# Patient Record
Sex: FEMALE | Race: WHITE
[De-identification: names, ages, dates, MRNs, and addresses within clinical notes are randomized per-mention and may not be internally consistent; named-entity substitution may affect disease eponyms.]

---

## 2023-01-20 ENCOUNTER — HOSPITAL ENCOUNTER (EMERGENCY)
Dept: HOSPITAL 33 - ED | Age: 88
Discharge: ANOTHER HOSPITAL | End: 2023-01-20
Payer: MEDICARE

## 2023-01-20 VITALS — HEART RATE: 68 BPM | DIASTOLIC BLOOD PRESSURE: 61 MMHG | SYSTOLIC BLOOD PRESSURE: 115 MMHG | OXYGEN SATURATION: 94 %

## 2023-01-20 DIAGNOSIS — S02.842A: ICD-10-CM
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
## 2023-01-20 DIAGNOSIS — S02.40DA: ICD-10-CM

## 2023-01-20 DIAGNOSIS — S02.32XA: ICD-10-CM

## 2023-01-20 DIAGNOSIS — Z79.02: ICD-10-CM

## 2023-01-20 DIAGNOSIS — S01.112A: Primary | ICD-10-CM
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
## 2023-01-20 DIAGNOSIS — Z79.899: ICD-10-CM

## 2023-01-20 PROCEDURE — 70486 CT MAXILLOFACIAL W/O DYE: CPT

## 2023-01-20 PROCEDURE — 99284 EMERGENCY DEPT VISIT MOD MDM: CPT

## 2023-01-20 PROCEDURE — 72125 CT NECK SPINE W/O DYE: CPT

## 2023-01-20 PROCEDURE — 70450 CT HEAD/BRAIN W/O DYE: CPT

## 2023-01-20 PROCEDURE — 12011 RPR F/E/E/N/L/M 2.5 CM/<: CPT

## 2023-01-20 NOTE — XRAY
Indication: Left frontal and left periorbital pain following fall.



Multiple contiguous axial images obtained through the head without contrast.



Comparison: October 29, 2016



Again age-appropriate global atrophy and progressive worsening mild

periventricular degenerative micro-ischemia bilaterally.  No acute

intracranial hemorrhage, abnormal extra-axial fluid collection, or mass

effect.  New left facial and left supraorbital soft tissue swelling/hematoma.

Bony calvarium intact.

CT facial bones and CT cervical spine reported separately.



Impression: Atrophy and degenerative micro-ischemia within normal limits for

patient's age.  No new/acute intracranial abnormalities.



Comment: Preliminary interpretation made by [facility].  No critical discrepancy. Please call patient and let him know

## 2023-01-20 NOTE — ERPHSYRPT
- History of Present Illness


Time Seen by Provider: 01/20/23 00:22


Source: patient, EMS


Exam Limitations: clinical condition


Physician History: 





89 years old female resident of nursing home presented in the ER via EMS with 

chief complaint of fall while she went to the bathroom, lost her balance and hit

her left lateral eyebrow/orbital ridge against bathtub.  No loss of 

consciousness.  Has a laceration.  No difficulty movements of eyeball.  Patient 

denies injury anywhere else.  Denies any chest pain palpitations or shortness of

breath before or after the fall. 


Occurred: just prior to arrival


Severity: moderate


Head Injury Location: frontal


Method of Injury: fell


Loss of Consciousness: no loss of consciousness


Associated Symptoms: denies symptoms


Allergies/Adverse Reactions: 








No Known Drug Allergies Allergy (Verified 10/29/16 15:01)


   





Home Medications: 








Clopidogrel Bisulfate [Plavix] 75 mg PO DAILY 07/28/16 [History]


Donepezil HCl 10 mg PO HS 01/20/23 [History]


Escitalopram Oxalate [Lexapro] 2.5 mg PO DAILY 01/20/23 [History]


Famotidine 20 mg*** [Pepcid 20 MG***] 20 mg PO BID 01/20/23 [History]


Ferrous Sulfate [Ferosul] 325 mg PO BID 01/20/23 [History]


Latanoprost/Pf [Latanoprost 0.005% Eye Drop] 1 drop OP HS 01/20/23 [History]


Memantine HCl 10 mg PO BID 01/20/23 [History]


Timolol Maleate 0.25% Eye*** [Timolol 0.25% Opth Sol 5 ML***] 1 drop OP DAILY 

01/20/23 [History]


clonazePAM [Clonazepam] 0.125 mg PO DAILY 01/20/23 [History]


clonazePAM [Clonazepam] 0.25 mg PO HS 01/20/23 [History]





Hx Tetanus, Diphtheria Vaccination/Date Given: No


Hx Influenza Vaccination/Date Given: Yes


Hx Pneumococcal Vaccination/Date Given: Yes





- Review of Systems


Constitutional: No Symptoms


Eyes: No Symptoms


Ears, Nose, & Throat: No Symptoms


Respiratory: No Symptoms


Cardiac: No Symptoms


Abdominal/Gastrointestinal: No Symptoms


Genitourinary Symptoms: No Symptoms


Musculoskeletal: Fall


Skin: Skin Lesions


Neurological: Headache


Endocrine: No Symptoms


Hematologic/Lymphatic: No Symptoms


Immunological/Allergic: No Symptoms





- Past Medical History


Pertinent Past Medical History: Yes


Neurological History: No Pertinent History


ENT History: No Pertinent History


Cardiac History: Angina


Respiratory History: No Pertinent History


Endocrine Medical History: No Pertinent History


Musculoskeletal History: No Pertinent History


GI Medical History: GERD


 History: No Pertinent History, Other


Psycho-Social History: Anxiety, Depression


Female Reproductive Disorders: No Pertinent History


Other Medical History: some "memory troubles".  increased pressure in eyes.  3 

NATURAL BIRTHS





- Past Surgical History


Past Surgical History: Yes


Neuro Surgical History: No Pertinent History


Cardiac: No Pertinent History


Respiratory: No Pertinent History


Gastrointestinal: Cholecystectomy


Genitourinary: No Pertinent History, Other


Musculoskeletal: No Pertinent History


Female Surgical History: Hysterectomy


Other Surgical History: bilateral oophorectomy; surgery on both hands; eye 

surgery (pt can't remember what was done); bladder surgery "years ago, they 

stretched my bladder out".





- Social History


Smoking Status: Never smoker


Exposure to second hand smoke: No


Drug Use: none


Patient Lives Alone: No





- Nursing Vital Signs


Nursing Vital Signs: 


                               Initial Vital Signs











Temperature  97.0 F   01/20/23 00:18


 


Pulse Rate  69   01/20/23 00:18


 


Respiratory Rate  16   01/20/23 00:18


 


Blood Pressure  174/79   01/20/23 00:18


 


O2 Sat by Pulse Oximetry  96   01/20/23 00:18








                                   Pain Scale











Pain Intensity                 5

















- [place] Coma Score


Best Eye Response (Zoe): (4) open spontaneously


Best Verbal Response (Zoe): (5) oriented


Best Motor Response (Zoe): (6) obeys commands


Zoe Total: 15





- Physical Exam


General Appearance: no apparent distress, alert


Head Injury: contusions, lacerations (2.5 cm laceration left lateral orbital 

ridge.  Minimal oozing.  Hematoma.  No step in deformity.  Intact range of 

motion of left eyeball.), swelling, tenderness


Eye Exam: bilateral eye: normal inspection, PERRL, EOMI


ENT Exam: airway nml, No evidence of ENT injury, No dental injury


Neck Exam: supple, trachea midline, normal alignment, c-collar in place


Cardiovascular/Respiratory Exam: chest non-tender, normal breath sounds, regular

 rate/rhythm


Gastrointestinal/Abdominal Exam: soft, No no distention


Extremity Exam: non-tender, normal range of motion, normal inspection


Mental Status Exam: alert, oriented x 3, cooperative


CNs Exam: PERRL, No normal hearing


Coordination/Gait Exam: normal finger to nose


Motor/Sensory Exam: no motor deficit, no sensory deficit, no pronator drift


DTR Exam: bicep (R): 2+, bicep (L): 2+, knee (R): 2+, knee (L): 2+


Skin Exam: normal color


**SpO2 Interpretation**: normal


SpO2: 96


O2 Delivery: Room Air





Procedures





- Laceration/Wound Repair


  ** Left Frontal


Time of Procedure: 00:26


Wound Location: Left


Wound Length (cm): 2.5


Wound's Depth, Shape: into muscle, linear


Wound Explored: clean


Irrigated: Yes


Hibiclens Prep: Yes


Anesthesia: 1% Lidocaine


Volume Anesthetic (ccs): 3


Wound Repaired With: sutures


Suture Size/Type: 5-0, nylon


Number of Sutures: 4


Sterile Dressing Applied?: Yes


Ordered Tests: 


                               Active Orders 24 hr











 Category Date Time Status


 


 CERVICAL SPINE WO CONTRAST [CT] Stat Exams  01/20/23 00:22 Ordered


 


 FACIAL BONES WO CONTRAST [CT] Stat Exams  01/20/23 00:22 Ordered


 


 HEAD WITHOUT CONTRAST [CT] Stat Exams  01/20/23 00:22 Ordered








Medication Summary














Discontinued Medications














Generic Name Dose Route Start Last Admin





  Trade Name Freq  PRN Reason Stop Dose Admin


 


Lidocaine HCl  Confirm  01/20/23 00:23 





  Lidocaine Hcl 1% 20 Ml Mdv*** 20 Ml Ml  Administered  01/20/23 00:24 





  Dose  





  1 ml  





  .ROUTE  





  .STK-MED ONE  














- Progress


Progress: improved, re-examined


Progress Note: 





01/20/23 03:21


89-year-old is evaluated for ground-level fall with injury to the left lateral 

orbit area.  Laceration is repaired.  Obtain CT head which is negative for any 

acute intracranial findings.  CT cervical spine negative.  CT facial bones 

showed fracture orbit with fracture of maxilla as well at multiple places.  

Discussed with [facility] trauma services and patient is accepted for transfer.  

Plan of care discussed with patient and son in detail who understand and agree 

with plan of transfer.  She does not have injury anywhere else and it was a 

mechanical fall as patient is supposed to use walker for ambulation which she 

did not.  Do not think she needs any other work-up.


01/20/23 03:23





Discussed with Dr.: Other (Dr. Duron trauma services [facility])


Counseled pt/family regarding: diagnosis, need for follow-up, rad results





- Departure


Departure Disposition: Home


Clinical Impression: 


 Laceration of left orbital rim without complication, Fall, Orbital fracture, 

Maxillary fracture





Condition: Stable


Critical Care Time: No

## 2023-01-20 NOTE — XRAY
Indication: Left frontal and left periorbital pain following fall.



Multiple contiguous axial images obtained through the facial bones.  Sagittal

and coronal reformatted images obtained.



Comparison: October none



Osseous structures demineralized consistent with patient's age.  Patient is

edentulous.  Left facial and left supraorbital soft tissue swelling/hematoma.

Left supraorbital subcutaneous air bubbles presumed laceration.  Minimally

depressed fractures involving the anterior and lateral walls of the left

maxillary sinus with near-complete opacification of the left maxillary sinus.

Also minimally depressed fractures lateral wall and floor left orbit.  No

other fracture, suspicious bony lesions, or radiopaque foreign body.

Remaining paranasal sinuses and nasal passages are clear.  Mild nasal septal

deviation to the right.  Moderate left and mild right TMJ degenerative

changes.



CT head and CT cervical spine reported separately.



Impression:

1.  Left facial and left supraorbital soft tissue swelling/hematoma.

2.  Left maxillary sinus anterior and lateral wall fractures.  Also left orbit

lateral wall and floor fractures.

3.  New complete opacification left maxillary sinus presumed blood.

4.  Incidental osteopenia, mild nasal septal deviation, and bilateral TMJ

degenerative changes..



Comment: Preliminary interpretation made by C.  No critical discrepancy.

## 2023-01-20 NOTE — XRAY
Indication: Left frontal/left periorbital and neck pain following fall.



Multiple contiguous axial images obtained through the cervical spine.

Sagittal and coronal reformatted images obtained.



Comparison: October 29, 2016



Osseous structures demineralized consistent with patient's age.  Axial images

negative for acute fracture, suspicious bony lesions, or spinal canal

stenosis.  Slight progressive worsening mild C5-C7 degenerative changes with

new vacuum disc phenomena.  Also progressive worsening moderate atlantoaxial

and mild/moderate multilevel bilateral degenerative facet arthropathy.



Sagittal and coronal reformatted images again demonstrates accentuated

cervical lordosis and C4-C7 disc space narrowing.  No acute compression

fracture, subluxation, or jumped facet or normal-appearing craniocervical

junction.



Visualized noncontrasted soft tissues demonstrates worsening moderate

bilateral carotid calcifications.  Stable biapical calcified pleural plaquing

and scattered pulmonary fibrosis/scarring.



Impression:

1.  Negative acute fracture/subluxation.

2.  Chronic findings including osteopenia, multilevel degenerative changes,

carotid calcifications, biapical calcified pleural plaquing, and biapical

pulmonary fibrosis/scarring.



Comment: Preliminary interpretation made by [facility].  No critical discrepancy.

## 2023-02-04 ENCOUNTER — HOSPITAL ENCOUNTER (INPATIENT)
Dept: HOSPITAL 33 - ED | Age: 88
LOS: 3 days | Discharge: HOME | DRG: 965 | End: 2023-02-07
Attending: INTERNAL MEDICINE | Admitting: FAMILY MEDICINE
Payer: MEDICARE

## 2023-02-04 DIAGNOSIS — E87.6: ICD-10-CM

## 2023-02-04 DIAGNOSIS — D64.9: ICD-10-CM

## 2023-02-04 DIAGNOSIS — D72.829: ICD-10-CM

## 2023-02-04 DIAGNOSIS — S02.401A: ICD-10-CM

## 2023-02-04 DIAGNOSIS — S06.6X0A: Primary | ICD-10-CM

## 2023-02-04 DIAGNOSIS — Z79.01: ICD-10-CM
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
## 2023-02-04 DIAGNOSIS — R34: ICD-10-CM

## 2023-02-04 DIAGNOSIS — Z20.828: ICD-10-CM

## 2023-02-04 DIAGNOSIS — S02.92XA: ICD-10-CM

## 2023-02-04 DIAGNOSIS — S32.9XXA: ICD-10-CM

## 2023-02-04 DIAGNOSIS — Z79.899: ICD-10-CM

## 2023-02-04 DIAGNOSIS — I49.8: ICD-10-CM

## 2023-02-04 LAB
ALBUMIN SERPL-MCNC: 4.1 G/DL (ref 3.5–5)
ALP SERPL-CCNC: 72 U/L (ref 38–126)
ALT SERPL-CCNC: 29 U/L (ref 0–35)
AMYLASE SERPL-CCNC: 83 U/L (ref 30–110)
ANION GAP SERPL CALC-SCNC: 15.1 MEQ/L (ref 5–15)
APTT PPP: 22.5 SECONDS (ref 25.1–36.5)
AST SERPL QL: 33 U/L (ref 14–36)
BACTERIA UR CULT: (no result)
BASOPHILS # BLD AUTO: 0.05 X10^3/UL (ref 0–0.4)
BASOPHILS NFR BLD AUTO: 0.2 % (ref 0–0.4)
BILIRUB BLD-MCNC: 0.7 MG/DL (ref 0.2–1.3)
BUN SERPL-MCNC: 20 MG/DL (ref 7–17)
CALCIUM SPEC-MCNC: 8.9 MG/DL (ref 8.4–10.2)
CHLORIDE SERPL-SCNC: 97 MMOL/L (ref 98–107)
CO2 SERPL-SCNC: 23 MMOL/L (ref 22–30)
CREAT SERPL-MCNC: 0.64 MG/DL (ref 0.52–1.04)
EOSINOPHIL # BLD AUTO: 0.01 X10^3/UL (ref 0–0.5)
GFR SERPLBLD BASED ON 1.73 SQ M-ARVRAT: > 60 ML/MIN
GLUCOSE SERPL-MCNC: 215 MG/DL (ref 74–106)
HCT VFR BLD AUTO: 36.5 % (ref 35–47)
HGB BLD-MCNC: 11.6 G/DL (ref 12–16)
IMM GRANULOCYTES # BLD: 0.12 X10^3U/L (ref 0–0.03)
IMM GRANULOCYTES NFR BLD: 0.6 % (ref 0–0.4)
INR PPP: 1.08 (ref 0.8–3)
LIPASE SERPL-CCNC: 49 U/L (ref 23–300)
LYMPHOCYTES # SPEC AUTO: 1.35 X10^3/UL (ref 1–4.6)
MCH RBC QN AUTO: 32 PG (ref 26–32)
MCHC RBC AUTO-ENTMCNC: 31.8 G/DL (ref 32–36)
MONOCYTES # BLD AUTO: 1.36 X10^3/UL (ref 0–1.3)
NRBC # BLD AUTO: 0 X10^3U/L (ref 0–0.01)
NRBC BLD AUTO-RTO: 0 % (ref 0–0.1)
PLATELET # BLD AUTO: 307 X10^3/UL (ref 150–450)
POTASSIUM SERPLBLD-SCNC: 3.3 MMOL/L (ref 3.5–5.1)
PROT SERPL-MCNC: 7.8 G/DL (ref 6.3–8.2)
PROT UR STRIP-MCNC: 30 MG/DL
PROTHROMBIN TIME: 11.4 SECONDS (ref 9.4–12.5)
RBC # BLD AUTO: 3.63 X10^6/UL (ref 4.1–5.4)
RBC # URNS HPF: >100 /HPF (ref 0–5)
SODIUM SERPL-SCNC: 132 MMOL/L (ref 137–145)
WBC # BLD AUTO: 21 X10^3/UL (ref 4–10.5)
WBC URNS QL MICRO: (no result) /HPF (ref 0–5)

## 2023-02-04 PROCEDURE — 86922 COMPATIBILITY TEST ANTIGLOB: CPT

## 2023-02-04 PROCEDURE — 71250 CT THORAX DX C-: CPT

## 2023-02-04 PROCEDURE — 74176 CT ABD & PELVIS W/O CONTRAST: CPT

## 2023-02-04 PROCEDURE — 80053 COMPREHEN METABOLIC PANEL: CPT

## 2023-02-04 PROCEDURE — 83690 ASSAY OF LIPASE: CPT

## 2023-02-04 PROCEDURE — 85014 HEMATOCRIT: CPT

## 2023-02-04 PROCEDURE — 86901 BLOOD TYPING SEROLOGIC RH(D): CPT

## 2023-02-04 PROCEDURE — 93005 ELECTROCARDIOGRAM TRACING: CPT

## 2023-02-04 PROCEDURE — 51702 INSERT TEMP BLADDER CATH: CPT

## 2023-02-04 PROCEDURE — 86900 BLOOD TYPING SEROLOGIC ABO: CPT

## 2023-02-04 PROCEDURE — 94760 N-INVAS EAR/PLS OXIMETRY 1: CPT

## 2023-02-04 PROCEDURE — 86850 RBC ANTIBODY SCREEN: CPT

## 2023-02-04 PROCEDURE — 99291 CRITICAL CARE FIRST HOUR: CPT

## 2023-02-04 PROCEDURE — 36415 COLL VENOUS BLD VENIPUNCTURE: CPT

## 2023-02-04 PROCEDURE — 87086 URINE CULTURE/COLONY COUNT: CPT

## 2023-02-04 PROCEDURE — 80048 BASIC METABOLIC PNL TOTAL CA: CPT

## 2023-02-04 PROCEDURE — 84484 ASSAY OF TROPONIN QUANT: CPT

## 2023-02-04 PROCEDURE — 82150 ASSAY OF AMYLASE: CPT

## 2023-02-04 PROCEDURE — 70486 CT MAXILLOFACIAL W/O DYE: CPT

## 2023-02-04 PROCEDURE — 85610 PROTHROMBIN TIME: CPT

## 2023-02-04 PROCEDURE — 99285 EMERGENCY DEPT VISIT HI MDM: CPT

## 2023-02-04 PROCEDURE — 72125 CT NECK SPINE W/O DYE: CPT

## 2023-02-04 PROCEDURE — 96374 THER/PROPH/DIAG INJ IV PUSH: CPT

## 2023-02-04 PROCEDURE — 85730 THROMBOPLASTIN TIME PARTIAL: CPT

## 2023-02-04 PROCEDURE — 81001 URINALYSIS AUTO W/SCOPE: CPT

## 2023-02-04 PROCEDURE — 85025 COMPLETE CBC W/AUTO DIFF WBC: CPT

## 2023-02-04 PROCEDURE — 36430 TRANSFUSION BLD/BLD COMPNT: CPT

## 2023-02-04 PROCEDURE — 70450 CT HEAD/BRAIN W/O DYE: CPT

## 2023-02-04 PROCEDURE — 85018 HEMOGLOBIN: CPT

## 2023-02-04 RX ADMIN — TIMOLOL MALEATE SCH ML: 2.5 SOLUTION OPHTHALMIC at 15:33

## 2023-02-04 RX ADMIN — ESCITALOPRAM OXALATE SCH MG: 10 TABLET ORAL at 15:30

## 2023-02-04 RX ADMIN — HYDROCODONE BITARTRATE AND ACETAMINOPHEN SCH TAB: 5; 325 TABLET ORAL at 15:31

## 2023-02-04 RX ADMIN — CEFTRIAXONE SCH MLS/HR: 1 INJECTION, SOLUTION INTRAVENOUS at 17:01

## 2023-02-04 RX ADMIN — LATANOPROST SCH ML: 50 SOLUTION OPHTHALMIC at 22:19

## 2023-02-04 RX ADMIN — Medication SCH TAB: at 15:30

## 2023-02-04 RX ADMIN — HYDROCODONE BITARTRATE AND ACETAMINOPHEN SCH TAB: 5; 325 TABLET ORAL at 22:15

## 2023-02-04 RX ADMIN — Medication SCH: at 15:42

## 2023-02-04 RX ADMIN — FAMOTIDINE SCH MG: 20 TABLET, FILM COATED ORAL at 22:20

## 2023-02-04 NOTE — XRAY
Indication: Status post fall.  Poor historian.



Multiple contiguous images obtained through the chest without contrast.



Comparison: January 20, 2017



Lungs again hyperinflated with diffuse scattered fibrosis/scarring and

subpleural cavitary lesions in the medial right mid to upper lung.  No new

pulmonary mass, infiltrate, effusion, or pneumothorax.  Heart not enlarged

again with coronary calcifications.  Aorta remains mildly arteriosclerotic

without aneurysm.  No pathologic mediastinal lymphadenopathy.



Bony thorax intact again with osteopenia, mild/moderate multilevel

degenerative spondylosis, and mild dextroscoliosis.  New T7-T9 and T11-T12

compression fractures of uncertain chronicity.



CT abdomen and pelvis and CT cervical spine reported separately.



Impression:

1.  Grossly stable hyperinflated lungs with scattered fibrosis/scarring and

right lung subpleural cystic changes are differential unchanged.

2.  T7-T9 and T11-T12 compression fractures of synchronicity.

3.  Again chronic findings including arteriosclerotic disease and chronic bony

findings.



Comment: Preliminary interpretation made by C.  No critical discrepancy.

## 2023-02-04 NOTE — XRAY
Indication: Status post fall.  Poor historian.



Multiple contiguous images obtained through the head without contrast.



Comparison: January 20, 2023



Study is slightly degraded by motion artifact throughout.  New focus small

subarachnoid hemorrhage left vertex and posterior right sylvian fissure.

Smaller acute hemorrhage left paramesencephalic cistern and small subdural

hematomas both temporal lobes anteriorly.  Fourth ventricle is midline without

hydrocephalus.  Bony calvarium intact.



CT facial bones and CT cervical spine reported separately.



Impression:

1.  Motion artifact.

2.  New multifocal bilateral acute intracranial hemorrhages as detailed.



Comment: Preliminary interpretation made by [facility].  No critical discrepancy.

## 2023-02-04 NOTE — XRAY
Indication: Status post fall.  Poor historian.



Multiple contiguous images obtained through the cervical spine.  Sagittal and

coronal reformatted images obtained.



Comparison: January 20, 2023



Study is slightly degraded by motion artifact throughout.  Again osteopenia

with grossly stable mild multilevel degenerative changes including C6-C7

degenerative vacuum disc phenomena.  Axial images negative for acute fracture,

suspicious bony lesions, or spinal canal stenosis.  Sagittal and coronal

reformatted images again demonstrates accentuated cervical lordosis.  No acute

compression fracture, subluxation, or jumped facet.  Normal appearing

craniocervical junction.



Visualized noncontrasted soft tissues again demonstrates moderate bilateral

carotid calcifications.



CT facial bones, CT head, and CT chest reported separately.



Impression:

1.  Motion artifact.

2.  Continued negative acute fracture/subluxation.

3.  Again osteopenia, multilevel degenerative changes, and bilateral carotid

calcifications.



Comment: Preliminary interpretation made by C.  No critical discrepancy.

## 2023-02-04 NOTE — PCM.HP
History of Present Illness





- Chief Complaint


Chief Complaint: SAH, Facial FX, Pelvic Fx,Fall, COPD


History of Present Illness: 


 is a 89 year old female pt at the [facility] who was brought into ER with 

multiple injuries after a fall.  At 0430 she was found out of bed and was put 

back into bed.  At shift shange, it was noted that she was not acting right and 

seemed to have hip pain so she was brought to ER.  CT showed bilat subarachnoid 

hemorrhages and 1 parenchymal hemorrhage.  She also has R pelvis fracture and 

facial fractures, although it's unclear which are new and which are old (pt did 

have a fall recently and a healing bruise on R side of forehead).





The family would like her to be SCO and a DNR form was signed.  Prior to 

admission, she would get up every day and make her bed and tidy her room.





On exam, pt denies pain to me, until we move her then she does grimace.





- Review of Systems


All Other Systems: Unable due to condition





Medications & Allergies


Home Medications: 


                              Home Medication List





Clopidogrel Bisulfate [Plavix] 75 mg PO DAILY 07/28/16 [History Confirmed 

02/04/23]


Donepezil HCl 10 mg PO HS 01/20/23 [History Confirmed 02/04/23]


Escitalopram Oxalate [Lexapro] 2.5 mg PO DAILY 01/20/23 [History Confirmed 

02/04/23]


Famotidine 20 mg*** [Pepcid 20 MG***] 20 mg PO BID 01/20/23 [History Confirmed 

02/04/23]


Ferrous Sulfate [Ferosul] 325 mg PO BID 01/20/23 [History Confirmed 02/04/23]


Latanoprost/Pf [Latanoprost 0.005% Eye Drop] 1 drop OP HS 01/20/23 [History 

Confirmed 02/04/23]


Memantine HCl 5 mg PO BID 01/20/23 [History Confirmed 02/04/23]


Timolol Maleate 0.25% Eye*** [Timolol 0.25% Opth Sol 5 ML***] 1 drop OP DAILY 

01/20/23 [History Confirmed 02/04/23]


clonazePAM [Clonazepam] 0.125 mg PO DAILY 01/20/23 [History Confirmed 02/04/23]


clonazePAM [Clonazepam] 0.25 mg PO HS 01/20/23 [History Confirmed 02/04/23]


Acetaminophen 325 mg*** [Tylenol 325 mg***] 650 mg PO Q4H PRN 02/04/23 [History 

Confirmed 02/04/23]


Vits A,C,E/Lutein/Minerals [I-Sonia Tablet] 1 each PO DAILY 02/04/23 [History 

Confirmed 02/04/23]








Allergies/Adverse Reactions: 


                                    Allergies











Allergy/AdvReac Type Severity Reaction Status Date / Time


 


No Known Drug Allergies Allergy   Verified 01/20/23 03:54














- Past Medical History


Past Medical History: Yes


Neurological History: No Pertinent History


ENT History: No Pertinent History


Cardiac History: Angina


Respiratory History: No Pertinent History


Endocrine Medical History: No Pertinent History


Musculoskelatal History: No Pertinent History


GI Medical History: GERD


 History: No Pertinent History, Other


Pyscho-Social History: Anxiety, Depression


Reproductive Disorders: No Pertinent History


Comment: some "memory troubles".  increased pressure in eyes.  3 NATURAL BIRTHS.

 HYSTERECTOMY.  HIP REPLACEMENT





- Female History


Are you pregnant now?: No





- Past Surgical History


Past Surgical History: Yes


Neuro Surgical History: No Pertinent History


Cardiac History: No Pertinent History


Respiratory Surgery: No Pertinent History


GI Surgical History: Cholecystectomy


Genitourinary Surgical Hx: No Pertinent History, Other


Musculskeletal Surgical Hx: No Pertinent History


Female Surgical History: Hysterectomy


Other Surgical History: bilateral oophorectomy; surgery on both hands; eye 

surgery (pt can't remember what was done); bladder surgery "years ago, they 

stretched my bladder out".





- Social History


Smoking Status: Never smoker


Exposure to second hand smoke: No


Alcohol: None


Drug Use: none





- Physical Exam


Vital Signs: 


                               Vital Signs - 24 hr











  Temp Pulse Resp BP Pulse Ox


 


 02/04/23 10:54  98 F  73  16  96/56  90 L


 


 02/04/23 10:11  97.3 F  80  20  86/46  88 L


 


 02/04/23 09:35      100


 


 02/04/23 09:06  97.2 F  83  22  92/57  100


 


 02/04/23 08:44   82  23  112/57  97


 


 02/04/23 08:04  97.6 F  86  18  79/60  90 L











General Appearance: no apparent distress, thin


Neurologic Exam: alert, other (very hard of hearing.)


Eye Exam: other (developing bruise around L eye.  Healing bruise around R eye.)


Ears, Nose, Throat Exam: moist mucous membranes


Neck Exam: mass (central anterior neck, firm)


Respiratory Exam: normal breath sounds, lungs clear, No crackles/rales, No 

rhonchi, No wheezing


Cardiovascular Exam: normal heart sounds, irregular, No murmur


Gastrointestinal/Abdomen Exam: soft, normal bowel sounds, No tenderness, No 

distention, No mass, No guarding, No rebound


Extremity Exam: other (R hip at greater trochanter there is very mild bruising)


Skin Exam: warm, dry, No rash





Results





- Labs


Lab/Micro Results: 


                            Lab Results-Last 24 Hours











  02/04/23 02/04/23 02/04/23 Range/Units





  08:10 08:10 08:10 


 


WBC  21.0 H    (4.0-10.5)  x10^3/uL


 


RBC  3.63 L    (4.1-5.4)  x10^6/uL


 


Hgb  11.6 L    (12.0-16.0)  g/dL


 


Hct  36.5    (35-47)  %


 


MCV  100.6 H    ()  fL


 


MCH  32.0    (26-32)  pg


 


MCHC  31.8 L    (32-36)  g/dL


 


RDW  13.4    (11.5-14.0)  %


 


Plt Count  307    (150-450)  x10^3/uL


 


MPV  9.1    (7.5-11.0)  fL


 


Gran %  86.3 H    (36.0-66.0)  %


 


Immature Gran % (Auto)  0.6 H    (0.00-0.4)  %


 


Nucleat RBC Rel Count  0.0    (0.00-0.1)  %


 


Eos # (Auto)  0.01    (0-0.5)  x10^3/uL


 


Immature Gran # (Auto)  0.12 H    (0.00-0.03)  x10^3u/L


 


Absolute Lymphs (auto)  1.35    (1.0-4.6)  x10^3/uL


 


Absolute Monos (auto)  1.36 H    (0.0-1.3)  x10^3/uL


 


Absolute Nucleated RBC  0.00    (0.00-0.01)  x10^3u/L


 


Lymphocytes %  6.4 L    (24.0-44.0)  %


 


Monocytes %  6.5    (0.0-12.0)  %


 


Eosinophils %  0.0    (0.00-5.0)  %


 


Basophils %  0.2    (0.0-0.4)  %


 


Absolute Granulocytes  18.12 H    (1.4-6.9)  x10^3/uL


 


Basophils #  0.05    (0-0.4)  x10^3/uL


 


PT     (9.4-12.5)  SECONDS


 


INR     (0.8-3.0)  


 


APTT     (25.1-36.5)  SECONDS


 


Sodium   132 L   (137-145)  mmol/L


 


Potassium   3.3 L   (3.5-5.1)  mmol/L


 


Chloride   97 L   ()  mmol/L


 


Carbon Dioxide   23   (22-30)  mmol/L


 


Anion Gap   15.1 H   (5-15)  MEQ/L


 


BUN   20 H   (7-17)  mg/dL


 


Creatinine   0.64   (0.52-1.04)  mg/dL


 


Estimated GFR   > 60.0   ML/MIN


 


Glucose   215 H   ()  mg/dL


 


Calcium   8.9   (8.4-10.2)  mg/dL


 


Total Bilirubin   0.70   (0.2-1.3)  mg/dL


 


AST   33   (14-36)  U/L


 


ALT   29   (0-35)  U/L


 


Alkaline Phosphatase   72   ()  U/L


 


Troponin I    < 0.012  (0.000-0.034)  ng/mL


 


Serum Total Protein   7.8   (6.3-8.2)  g/dL


 


Albumin   4.1   (3.5-5.0)  g/dL


 


Amylase   83   ()  U/L


 


Lipase   49   ()  U/L


 


Urine Color     (Yellow)  


 


Urine Appearance     (Clear)  


 


Urine pH     (4.6-8.0)  


 


Ur Specific Gravity     (1.005-1.030)  


 


Urine Protein     (Negative)  


 


Urine Glucose (UA)     (Negative)  mg/dL


 


Urine Ketones     (Negative)  


 


Urine Blood     (Negative)  


 


Urine Nitrite     (Negative)  


 


Urine Bilirubin     (Negative)  


 


Urine Urobilinogen     (0.2)  mg/dL


 


Ur Leukocyte Esterase     (Negative)  


 


U Hyaline Cast (Auto)     (0-2)  /LPF


 


Urine Microscopic RBC     (0-5)  /HPF


 


Urine Microscopic WBC     (0-5)  /HPF


 


Ur Epithelial Cells     (None Seen)  /HPF


 


Urine Bacteria     (None Seen)  /HPF


 


Urine Culture Reflexed     (NO)  














  02/04/23 02/04/23 Range/Units





  09:30 09:46 


 


WBC    (4.0-10.5)  x10^3/uL


 


RBC    (4.1-5.4)  x10^6/uL


 


Hgb    (12.0-16.0)  g/dL


 


Hct    (35-47)  %


 


MCV    ()  fL


 


MCH    (26-32)  pg


 


MCHC    (32-36)  g/dL


 


RDW    (11.5-14.0)  %


 


Plt Count    (150-450)  x10^3/uL


 


MPV    (7.5-11.0)  fL


 


Gran %    (36.0-66.0)  %


 


Immature Gran % (Auto)    (0.00-0.4)  %


 


Nucleat RBC Rel Count    (0.00-0.1)  %


 


Eos # (Auto)    (0-0.5)  x10^3/uL


 


Immature Gran # (Auto)    (0.00-0.03)  x10^3u/L


 


Absolute Lymphs (auto)    (1.0-4.6)  x10^3/uL


 


Absolute Monos (auto)    (0.0-1.3)  x10^3/uL


 


Absolute Nucleated RBC    (0.00-0.01)  x10^3u/L


 


Lymphocytes %    (24.0-44.0)  %


 


Monocytes %    (0.0-12.0)  %


 


Eosinophils %    (0.00-5.0)  %


 


Basophils %    (0.0-0.4)  %


 


Absolute Granulocytes    (1.4-6.9)  x10^3/uL


 


Basophils #    (0-0.4)  x10^3/uL


 


PT  11.4   (9.4-12.5)  SECONDS


 


INR  1.08   (0.8-3.0)  


 


APTT  22.5 L   (25.1-36.5)  SECONDS


 


Sodium    (137-145)  mmol/L


 


Potassium    (3.5-5.1)  mmol/L


 


Chloride    ()  mmol/L


 


Carbon Dioxide    (22-30)  mmol/L


 


Anion Gap    (5-15)  MEQ/L


 


BUN    (7-17)  mg/dL


 


Creatinine    (0.52-1.04)  mg/dL


 


Estimated GFR    ML/MIN


 


Glucose    ()  mg/dL


 


Calcium    (8.4-10.2)  mg/dL


 


Total Bilirubin    (0.2-1.3)  mg/dL


 


AST    (14-36)  U/L


 


ALT    (0-35)  U/L


 


Alkaline Phosphatase    ()  U/L


 


Troponin I    (0.000-0.034)  ng/mL


 


Serum Total Protein    (6.3-8.2)  g/dL


 


Albumin    (3.5-5.0)  g/dL


 


Amylase    ()  U/L


 


Lipase    ()  U/L


 


Urine Color   Dark Yellow A  (Yellow)  


 


Urine Appearance   Cloudy A  (Clear)  


 


Urine pH   6.0  (4.6-8.0)  


 


Ur Specific Gravity   1.025  (1.005-1.030)  


 


Urine Protein   30  (Negative)  


 


Urine Glucose (UA)   Negative  (Negative)  mg/dL


 


Urine Ketones   Trace A  (Negative)  


 


Urine Blood   Large A  (Negative)  


 


Urine Nitrite   Negative  (Negative)  


 


Urine Bilirubin   Negative  (Negative)  


 


Urine Urobilinogen   1.0 A  (0.2)  mg/dL


 


Ur Leukocyte Esterase   Trace A  (Negative)  


 


U Hyaline Cast (Auto)   6-10 A  (0-2)  /LPF


 


Urine Microscopic RBC   >100 A  (0-5)  /HPF


 


Urine Microscopic WBC   3-5  (0-5)  /HPF


 


Ur Epithelial Cells   None Seen  (None Seen)  /HPF


 


Urine Bacteria   None Seen  (None Seen)  /HPF


 


Urine Culture Reflexed   ORDERED SEPARATELY  (NO)  














- Radiology Impressions


Radiology Exams & Impressions: 


                              Radiology Procedures











 Category Date Time Status


 


 ABDOMEN AND PELVIS W/0 CONTRAS [CT] Stat Exams  02/04/23 08:10 Taken


 


 CERVICAL SPINE WO CONTRAST [CT] Stat Exams  02/04/23 08:10 Taken


 


 CHEST WITHOUT CONTRAST [CT] Stat Exams  02/04/23 08:10 Taken


 


 FACIAL BONES WO CONTRAST [CT] Stat Exams  02/04/23 08:14 Taken


 


 HEAD WITHOUT CONTRAST [CT] Stat Exams  02/04/23 08:10 Taken














Assessment/Plan


(1) Subarachnoid hemorrhage


Current Visit: Yes   Status: Acute   


Assessment & Plan: 


Family is aware that prognosis is very guarded to poor and they would like her 

kept comfortable. Will try giving some scheduled po pain meds, if tolerated.  


Code(s): I60.9 - NONTRAUMATIC SUBARACHNOID HEMORRHAGE, UNSPECIFIED   





(2) Arrhythmia


Current Visit: Yes   Status: Acute   


Qualifiers: 


   Arrhythmia type: other cardiac arrhythmia   Qualified Code(s): I49.8 - Other 

specified cardiac arrhythmias   


Assessment & Plan: 


Will observe for now.  Pt could not have any blood thinners currently.


Code(s): I49.9 - CARDIAC ARRHYTHMIA, UNSPECIFIED   





(3) Leukocytosis


Current Visit: Yes   Status: Acute   


Qualifiers: 


   Leukocytosis type: unspecified   Qualified Code(s): D72.829 - Elevated white 

blood cell count, unspecified   


Assessment & Plan: 


may be reactive. However, there is a left shift.  Lots of hematuria (which could

 be from pelvic fx), so will go ahead with 1g rocephin IV while UCx is pending.


Code(s): D72.829 - ELEVATED WHITE BLOOD CELL COUNT, UNSPECIFIED   





(4) Hypokalemia


Current Visit: Yes   Status: Acute   Code(s): E87.6 - HYPOKALEMIA   





(5) Facial bones, closed fracture


Current Visit: Yes   Status: Acute   


Qualifiers: 


   Encounter type: initial encounter   Facial bone/location: unspecified facial 

bone   Qualified Code(s): S02.92XA - Unspecified fracture of facial bones, 

initial encounter for closed fracture   


Assessment & Plan: 


final reports are pending.


Code(s): S02.92XA - UNSP FRACTURE OF FACIAL BONES, INIT FOR CLOS FX   





(6) Fall


Current Visit: Yes   Status: Acute   


Qualifiers: 


   Encounter type: initial encounter   Qualified Code(s): W19.XXXA - Unspecified

 fall, initial encounter   


Code(s): W19.XXXA - UNSPECIFIED FALL, INITIAL ENCOUNTER   





(7) Pelvic fracture


Current Visit: Yes   Status: Acute   


Qualifiers: 


   Encounter type: initial encounter 


Code(s): S32.9XXA - FRACTURE OF UNSP PARTS OF LUMBOSACRAL SPINE AND PELVIS, INIT

   





(8) DNR (do not resuscitate)


Current Visit: Yes   Status: Acute

## 2023-02-04 NOTE — XRAY
Indication: Status post fall.  Poor historian.



Multiple contiguous images obtained through the abdomen and pelvis without

contrast.



Comparison: August 17, 2016



CT chest reported separately.



Noncontrasted stomach and bowel loops nonobstructed again with scattered

colonic diverticulosis.  New mild rectal fecal impaction.  Again

cholecystectomy, hysterectomy, and calcified splenic granulomas.  No free

fluid/air.



Remaining liver, pancreas, spleen, adrenal glands, kidneys, and bladder are

unremarkable for noncontrast exam.  Again mild scattered aortoiliac

calcifications without AAA.



Bone windows demonstrates new comminuted fracture right innominate bone

adjacent to the SI joint.  Minimal diastasis right SI joint.  Also new acute

comminuted fracture right pubic symphysis.  New findings remote-appearing

fracture superior endplate L3 with 25-50% height loss, remote right inferior

pubic ramus fracture, and bilateral proximal femur orthopedic hardware.

Remaining osseous structures again demonstrates osteopenia.



Impression:

1.  New acute comminuted fractures right innominate bone and right pubic

symphysis.  Minimal diastasis right SI joint.

2.  New mild rectal fecal impaction.

3.  Chronic findings including colonic diverticulosis, arteriosclerotic

disease, and chronic bony findings.



Comment: Preliminary interpretation made by [facility].  No critical discrepancy.

## 2023-02-04 NOTE — XRAY
Indication: Status post fall.  Poor historian.



Multiple contiguous images obtained through the facial bones.  Sagittal and

coronal reformatted images obtained.



Comparison: January 20, 2023



Study slightly degraded by motion artifact.  Patient is again edentulous with

osteopenia.  New minimally depressed acute fracture right zygomatic arch with

mild overlying soft tissue swelling.  No other acute fracture, suspicious bony

lesions, or radiopaque foreign body.  Stable minimally depressed fractures

lateral wall and floor left orbit with interval healing.  Interval clearing

left maxillary sinus with healed fractures.  New mild fluid leveling right

maxillary sinus and mild mucosal thickening right ethmoid/right sphenoid

sinuses.  Remaining paranasal sinuses and nasal passages are clear.  Stable

minimal nasal septal deviation to the right and bilateral TMJ degenerative

changes.



CT head and CT cervical spine reported separately.



Impression:

1.  Motion artifact.

2.  New minimally depressed fracture right zygomatic arch.

3.  New paranasal sinuses disease.

4.  Osteopenia, healing left orbital fractures, nasoseptal deviation, and

bilateral TMJ degenerative changes.



Comment: Preliminary interpretation made by C.  No critical discrepancy.

## 2023-02-04 NOTE — ERPHSYRPT
- History of Present Illness


Time Seen by Provider: 02/04/23 08:19


Source: family (A son is present and does manage to give us some medical history

regarding her right hip which is apparently according to him been fractured 

before.), EMS (EMS also reported from the nursing home that there was no loss of

consciousness with some of the bruising present on her face is from previous 

falls not from this fall.  The EMS people also reported that on the monitor they

saw runs of atrial fib with RVR.)


Exam Limitations: clinical condition


Physician History: 





Patient is an 89-year-old white female DNR from the nursing home who suffered a 

fall at 4 AM at the nursing home.  She was picked up and placed back in bed and 

was complaining of right hip pain.  With a change of shifts she was reexamined 

and reevaluated and apparently sent in.  She has a history of frequent falls and

does have bruising from some previous falls.  She is alert and basically 

oriented but extremely hard of hearing.  EMS saw what they believed to be atrial

for with a rapid ventricular response while in route to the hospital.  Nursing 

home records indicate generalized anxiety disorder along with peripheral 

vascular disease, mild cognitive impairment, age-related osteoporosis, dry eye 

syndrome, abnormalities of gait and mobility, bilateral ocular hypertension, 

muscle weakness, GERD, history of COVID, repeated falls, maxillary fracture, 

left-sided fracture of the lateral orbital wall.


Occurred: this morning


Reason for Fall: fell from standing pos


Injuries/Pain Location: head, face, pelvis, lower extremity (Right hip pain)


Loss of Consciousness: no loss of consciousness


Severity of Pain-Max: moderate


Severity of Pain-Current: moderate


Modifying Factors: Improves With: movement


Associated Symptoms (Fall): chest pain (Right upper anterior chest pain), 

trouble walking


Allergies/Adverse Reactions: 








No Known Drug Allergies Allergy (Verified 01/20/23 03:54)


   





Home Medications: 








Clopidogrel Bisulfate [Plavix] 75 mg PO DAILY 07/28/16 [History]


Donepezil HCl 10 mg PO HS 01/20/23 [History]


Escitalopram Oxalate [Lexapro] 2.5 mg PO DAILY 01/20/23 [History]


Famotidine 20 mg*** [Pepcid 20 MG***] 20 mg PO BID 01/20/23 [History]


Ferrous Sulfate [Ferosul] 325 mg PO BID 01/20/23 [History]


Latanoprost/Pf [Latanoprost 0.005% Eye Drop] 1 drop OP HS 01/20/23 [History]


Memantine HCl 10 mg PO BID 01/20/23 [History]


Timolol Maleate 0.25% Eye*** [Timolol 0.25% Opth Sol 5 ML***] 1 drop OP DAILY 

01/20/23 [History]


clonazePAM [Clonazepam] 0.125 mg PO DAILY 01/20/23 [History]


clonazePAM [Clonazepam] 0.25 mg PO HS 01/20/23 [History]





Hx Tetanus, Diphtheria Vaccination/Date Given: No


Hx Influenza Vaccination/Date Given: Yes


Hx Pneumococcal Vaccination/Date Given: Yes





Travel Risk





- Vaccine Status


Have you recieved a Covid-19 vaccination: Yes


: Unknown





- Vaccination Dates


Dates if Unknown: 1/14/2021 and 2/11/2021





- Review of Systems


All Other Systems: Unable due to condition (Patient is so hard of hearing that 

review of systems is impossible to obtain.)





- Past Medical History


Pertinent Past Medical History: Yes


Neurological History: No Pertinent History


ENT History: No Pertinent History


Cardiac History: Angina


Respiratory History: No Pertinent History


Endocrine Medical History: No Pertinent History


Musculoskeletal History: No Pertinent History


GI Medical History: GERD


 History: No Pertinent History, Other


Psycho-Social History: Anxiety, Depression


Female Reproductive Disorders: No Pertinent History


Other Medical History: some "memory troubles".  increased pressure in eyes.  3 

NATURAL BIRTHS





- Past Surgical History


Past Surgical History: Yes


Neuro Surgical History: No Pertinent History


Cardiac: No Pertinent History


Respiratory: No Pertinent History


Gastrointestinal: Cholecystectomy


Genitourinary: No Pertinent History, Other


Musculoskeletal: No Pertinent History


Female Surgical History: Hysterectomy


Other Surgical History: bilateral oophorectomy; surgery on both hands; eye 

surgery (pt can't remember what was done); bladder surgery "years ago, they 

stretched my bladder out".





- Social History


Smoking Status: Never smoker


Exposure to second hand smoke: No


Drug Use: none


Patient Lives Alone: No





- Nursing Vital Signs


Nursing Vital Signs: 


                               Initial Vital Signs











Temperature  97.6 F   02/04/23 08:04


 


Pulse Rate  86   02/04/23 08:04


 


Respiratory Rate  18   02/04/23 08:04


 


Blood Pressure  79/60   02/04/23 08:04


 


O2 Sat by Pulse Oximetry  90 L  02/04/23 08:04








                                   Pain Scale











Pain Intensity                 2

















- Zoe Coma Score


Best Eye Response (Roslyn): (4) open spontaneously


Best Verbal Response (Zoe): (5) oriented


Best Motor Response (Zoe): (6) obeys commands


Roslyn Total: 15





- Physical Exam


General Appearance: mild distress


Head Injury: contusions, ecchymosis (Some of the ecchymoses are obviously older 

many look acute.), swelling, tenderness


ENT Exam: clotted nasal blood


Neck Exam: supple, trachea midline


Respiratory/Chest Exam: chest tenderness (Tenderness of the chest wall right 

upper anterior), normal breath sounds, No respiratory distress


Cardiovascular Exam: normal heart sounds, regular rate/rhythm


Gastrointestinal Exam: soft, normal bowel sounds, No distention, No guarding


Rectal Exam: deferred


Extremity Exam: normal inspection, normal range of motion, pelvis stable


Neurologic Exam: alert, oriented x 3, other (Extremely hard of hearing)


Skin Exam: abrasion, ecchymosis


**SpO2 Interpretation**: normal, O2 applied


SpO2: 100


O2 Delivery: Nasal Cannula





- Course


Nursing assessment & vital signs reviewed: Yes


EKG Interpreted by Me: RATE (93), Sinus Rhythm, NORMAL AXIS, Non-specific ST 

Changes, Other (Occasional PVCs Short VT interval Left ventricular hypertrophy)





- CT Exams


  ** Head


CT Interpretation: Other (Impression bilateral subarachnoid hemorrhage small 

extra-axial hemorrhages along the greater wing of the sphenoid and the middle 

cranial fossa bilaterally)





  ** Maxillofacial Bones


CT Interpretation: Other (Fracture of the right zygomatic arch and very likely a

 subtle fracture through the right maxillary sinus)





  ** Cervical Spine


CT Interpretation: Other (Impression of no acute fractures seen in the cervical 

spine there are degenerative changes.)





  ** Chest


CT Interpretation: Other (Heart size normal normal pulmonary vasculature severe 

coronary artery calcification seen no evidence of acute thoracic aortic aneurysm

 or acute intramural thoracic aortic hematoma.  There is also evidence of COPD. 

 There is also multiple pulmonary nodules)


Ordered Tests: 


                               Active Orders 24 hr











 Category Date Time Status


 


 EKG-ER Only STAT Care  02/04/23 08:11 Active


 


 ABDOMEN AND PELVIS W/0 CONTRAS [CT] Stat Exams  02/04/23 08:10 Ordered


 


 CERVICAL SPINE WO CONTRAST [CT] Stat Exams  02/04/23 08:10 Ordered


 


 CHEST WITHOUT CONTRAST [CT] Stat Exams  02/04/23 08:10 Ordered


 


 FACIAL BONES WO CONTRAST [CT] Stat Exams  02/04/23 08:14 Ordered


 


 HEAD WITHOUT CONTRAST [CT] Stat Exams  02/04/23 08:10 Ordered


 


 AMYLASE Stat Lab  02/04/23 08:10 Completed


 


 CBC W DIFF Stat Lab  02/04/23 08:10 Completed


 


 CMP Stat Lab  02/04/23 08:10 Completed


 


 CULTURE,URINE Stat Lab  02/04/23 08:14 Ordered


 


 LIPASE Stat Lab  02/04/23 08:10 Completed


 


 PROTIME WITH INR Stat Lab  02/04/23 08:11 Ordered


 


 PTT Stat Lab  02/04/23 08:11 Ordered


 


 TROPONIN Q4H Lab  02/04/23 08:10 Completed


 


 TROPONIN Q4H Lab  02/04/23 12:15 Ordered


 


 TROPONIN Q4H Lab  02/04/23 16:15 Ordered


 


 TROPONIN Q4H Lab  02/04/23 20:15 Ordered


 


 UA W/RFX UR CULTURE Stat Lab  02/04/23 08:12 Ordered








Medication Summary











Generic Name Dose Route Start Last Admin





  Trade Name Freq  PRN Reason Stop Dose Admin


 


Sodium Chloride  1,000 mls @ 100 mls/hr  02/04/23 08:15  02/04/23 09:11





  Sodium Chloride 0.9% 1000 Ml  IV  03/06/23 08:14  100 mls/hr





  .Q10H PAULINA   Administration














Discontinued Medications














Generic Name Dose Route Start Last Admin





  Trade Name Freq  PRN Reason Stop Dose Admin


 


Hydromorphone HCl  0.5 mg  02/04/23 08:11  02/04/23 09:11





  Hydromorphone 1 Mg/1ml Inj*** 1 Mg/Ml Syringe  IV  02/04/23 08:12  0.5 mg





  STAT ONE   Administration


 


Hydromorphone HCl  Confirm  02/04/23 09:08 





  Hydromorphone 1 Mg/1ml Inj*** 1 Mg/Ml Syringe  Administered  02/04/23 09:09 





  Dose  





  1 mg  





  .ROUTE  





  .STK-MED ONE  











Lab/Rad Data: 


                           Laboratory Result Diagrams





                                 02/04/23 08:10 





                                 02/04/23 08:10 





                               Laboratory Results











  02/04/23 02/04/23 02/04/23 Range/Units





  08:10 08:10 08:10 


 


WBC    21.0 H  (4.0-10.5)  x10^3/uL


 


RBC    3.63 L  (4.1-5.4)  x10^6/uL


 


Hgb    11.6 L  (12.0-16.0)  g/dL


 


Hct    36.5  (35-47)  %


 


MCV    100.6 H  ()  fL


 


MCH    32.0  (26-32)  pg


 


MCHC    31.8 L  (32-36)  g/dL


 


RDW    13.4  (11.5-14.0)  %


 


Plt Count    307  (150-450)  x10^3/uL


 


MPV    9.1  (7.5-11.0)  fL


 


Gran %    86.3 H  (36.0-66.0)  %


 


Immature Gran % (Auto)    0.6 H  (0.00-0.4)  %


 


Nucleat RBC Rel Count    0.0  (0.00-0.1)  %


 


Eos # (Auto)    0.01  (0-0.5)  x10^3/uL


 


Immature Gran # (Auto)    0.12 H  (0.00-0.03)  x10^3u/L


 


Absolute Lymphs (auto)    1.35  (1.0-4.6)  x10^3/uL


 


Absolute Monos (auto)    1.36 H  (0.0-1.3)  x10^3/uL


 


Absolute Nucleated RBC    0.00  (0.00-0.01)  x10^3u/L


 


Lymphocytes %    6.4 L  (24.0-44.0)  %


 


Monocytes %    6.5  (0.0-12.0)  %


 


Eosinophils %    0.0  (0.00-5.0)  %


 


Basophils %    0.2  (0.0-0.4)  %


 


Absolute Granulocytes    18.12 H  (1.4-6.9)  x10^3/uL


 


Basophils #    0.05  (0-0.4)  x10^3/uL


 


Sodium   132 L   (137-145)  mmol/L


 


Potassium   3.3 L   (3.5-5.1)  mmol/L


 


Chloride   97 L   ()  mmol/L


 


Carbon Dioxide   23   (22-30)  mmol/L


 


Anion Gap   15.1 H   (5-15)  MEQ/L


 


BUN   20 H   (7-17)  mg/dL


 


Creatinine   0.64   (0.52-1.04)  mg/dL


 


Estimated GFR   > 60.0   ML/MIN


 


Glucose   215 H   ()  mg/dL


 


Calcium   8.9   (8.4-10.2)  mg/dL


 


Total Bilirubin   0.70   (0.2-1.3)  mg/dL


 


AST   33   (14-36)  U/L


 


ALT   29   (0-35)  U/L


 


Alkaline Phosphatase   72   ()  U/L


 


Troponin I  < 0.012    (0.000-0.034)  ng/mL


 


Serum Total Protein   7.8   (6.3-8.2)  g/dL


 


Albumin   4.1   (3.5-5.0)  g/dL


 


Amylase   83   ()  U/L


 


Lipase   49   ()  U/L














- Progress


Progress: unchanged


Progress Note: 





02/04/23 09:30


With return of the imaging studies we did speak with the family it is their 

wishes that comfort measures only be applied to this patient which is also been 

her wish as well she is a DNR.We spoke with Dr. Allen who also agreed with 

comfort measures.


Discussed with : [place]


Will see patient in: hospital (full admit)





- Departure


Departure Disposition: In-patient Admission


Clinical Impression: 


 Subarachnoid hemorrhage, Facial bones, closed fracture, Pelvic fracture, Fall





Condition: Poor


Critical Care Time: Yes


Critical Care Time(excluding separately billable procedures): Critical 30-74 

mins (35)


Referrals: 


BHARAT AVILA MD [Primary Care Provider] - Follow up/PCP as directed

## 2023-02-05 LAB
ANION GAP SERPL CALC-SCNC: 8.8 MEQ/L (ref 5–15)
BASOPHILS # BLD AUTO: 0.02 X10^3/UL (ref 0–0.4)
BASOPHILS NFR BLD AUTO: 0.3 % (ref 0–0.4)
BUN SERPL-MCNC: 24 MG/DL (ref 7–17)
CALCIUM SPEC-MCNC: 7.9 MG/DL (ref 8.4–10.2)
CHLORIDE SERPL-SCNC: 102 MMOL/L (ref 98–107)
CO2 SERPL-SCNC: 27 MMOL/L (ref 22–30)
CREAT SERPL-MCNC: 0.55 MG/DL (ref 0.52–1.04)
EOSINOPHIL # BLD AUTO: 0.02 X10^3/UL (ref 0–0.5)
GFR SERPLBLD BASED ON 1.73 SQ M-ARVRAT: > 60 ML/MIN
GLUCOSE SERPL-MCNC: 119 MG/DL (ref 74–106)
HCT VFR BLD AUTO: 25 % (ref 35–47)
HGB BLD-MCNC: 7.9 G/DL (ref 12–16)
IMM GRANULOCYTES # BLD: 0.02 X10^3U/L (ref 0–0.03)
IMM GRANULOCYTES NFR BLD: 0.3 % (ref 0–0.4)
LYMPHOCYTES # SPEC AUTO: 0.81 X10^3/UL (ref 1–4.6)
MCH RBC QN AUTO: 31.9 PG (ref 26–32)
MCHC RBC AUTO-ENTMCNC: 31.6 G/DL (ref 32–36)
MONOCYTES # BLD AUTO: 0.68 X10^3/UL (ref 0–1.3)
NRBC # BLD AUTO: 0 X10^3U/L (ref 0–0.01)
NRBC BLD AUTO-RTO: 0 % (ref 0–0.1)
PLATELET # BLD AUTO: 186 X10^3/UL (ref 150–450)
POTASSIUM SERPLBLD-SCNC: 3.8 MMOL/L (ref 3.5–5.1)
RBC # BLD AUTO: 2.48 X10^6/UL (ref 4.1–5.4)
SODIUM SERPL-SCNC: 134 MMOL/L (ref 137–145)
WBC # BLD AUTO: 7 X10^3/UL (ref 4–10.5)

## 2023-02-05 RX ADMIN — DOCUSATE SODIUM SCH MG: 100 CAPSULE, LIQUID FILLED ORAL at 22:07

## 2023-02-05 RX ADMIN — LATANOPROST SCH ML: 50 SOLUTION OPHTHALMIC at 22:11

## 2023-02-05 RX ADMIN — HYDROCODONE BITARTRATE AND ACETAMINOPHEN SCH TAB: 5; 325 TABLET ORAL at 04:04

## 2023-02-05 RX ADMIN — HYDROCODONE BITARTRATE AND ACETAMINOPHEN SCH TAB: 5; 325 TABLET ORAL at 00:30

## 2023-02-05 RX ADMIN — HYDROCODONE BITARTRATE AND ACETAMINOPHEN SCH TAB: 5; 325 TABLET ORAL at 11:36

## 2023-02-05 RX ADMIN — HYDROCODONE BITARTRATE AND ACETAMINOPHEN SCH TAB: 5; 325 TABLET ORAL at 20:05

## 2023-02-05 RX ADMIN — FAMOTIDINE SCH MG: 20 TABLET, FILM COATED ORAL at 08:55

## 2023-02-05 RX ADMIN — ESCITALOPRAM OXALATE SCH MG: 10 TABLET ORAL at 08:55

## 2023-02-05 RX ADMIN — CEFTRIAXONE SCH MLS/HR: 1 INJECTION, SOLUTION INTRAVENOUS at 08:54

## 2023-02-05 RX ADMIN — HYDROCODONE BITARTRATE AND ACETAMINOPHEN SCH TAB: 5; 325 TABLET ORAL at 15:44

## 2023-02-05 RX ADMIN — Medication SCH TAB: at 08:55

## 2023-02-05 RX ADMIN — FAMOTIDINE SCH MG: 20 TABLET, FILM COATED ORAL at 22:07

## 2023-02-05 RX ADMIN — HYDROCODONE BITARTRATE AND ACETAMINOPHEN SCH TAB: 5; 325 TABLET ORAL at 07:42

## 2023-02-05 RX ADMIN — DOCUSATE SODIUM SCH MG: 100 CAPSULE, LIQUID FILLED ORAL at 08:59

## 2023-02-05 RX ADMIN — TIMOLOL MALEATE SCH ML: 2.5 SOLUTION OPHTHALMIC at 08:55

## 2023-02-05 NOTE — PCM.NOTE
Date and Time: 02/05/23  1051





Subjective Assessment: 





She has been talking to some of the staff, although often can't answer 

questions.  She denies pain.


Only 75 cc urine out last night, although it has been light yellow and clear.





- Review of Systems


All Other Systems: Unable due to dementia





Objective Exam


General Appearance: no apparent distress, alert


Neurologic Exam: disoriented, other (very [place].)


Skin Exam: warm, dry, other (Healing bruise lateral to R eye.  Newer bruise L 

lower eyelid.)


Ears, Nose, Throat Exam: other (throat with decreased size of mass; bruise is 

present.)


Respiratory Exam: diminished breath sounds (fair air exchange), No 

crackles/rales, No rhonchi, No wheezing


Cardiovascular Exam: regular rate/rhythm, normal heart sounds, No murmur


Gastrointestinal/Abdomen Exam: soft, normal bowel sounds, No tenderness, No 

distention, No mass, No guarding, No rebound


Extremity Exam: normal inspection, No pedal edema, No swelling


Back Exam: other (kyphotic)





OBJECTIVE DATA


Vital Signs: 


                               Vital Signs - 24 hr











  Temp Pulse Resp BP Pulse Ox


 


 02/05/23 07:58  97.4 F  89  17  106/50  90 L


 


 02/05/23 04:00  98.3 F  87  16  108/58  95


 


 02/05/23 00:00  98.6 F  76  16  100/54  92 L


 


 02/04/23 20:00  98.6 F  74  16  95/54  93 L


 


 02/04/23 16:52  97.9 F  59 L  16  88/51  94 L


 


 02/04/23 10:54  98 F  73  16  96/56  90 L








                        Pain Assessment - Last Documented











Pain Intensity                 0


 


Pain Scale Used                0-10 Pain Scale











Intake and Output: 


                                 Intake & Output











 02/02/23 02/03/23 02/04/23 02/05/23





 11:59 11:59 11:59 11:59


 


Intake Total    1547


 


Output Total    275


 


Balance    1272


 


Weight   43 kg 











Radiology Exams: 


                              Radiology Procedures











 Category Date Time Status


 


 ABDOMEN AND PELVIS W/0 CONTRAS [CT] Stat Exams  02/04/23 08:10 Completed


 


 CERVICAL SPINE WO CONTRAST [CT] Stat Exams  02/04/23 08:10 Completed


 


 CHEST WITHOUT CONTRAST [CT] Stat Exams  02/04/23 08:10 Completed


 


 FACIAL BONES WO CONTRAST [CT] Stat Exams  02/04/23 08:14 Completed


 


 HEAD WITHOUT CONTRAST [CT] Stat Exams  02/04/23 08:10 Completed














Assessment/Plan


(1) Subarachnoid hemorrhage


Current Visit: Yes   Status: Acute   


Assessment & Plan: 


clinically she appears to be stable; prognosis remains guarded.


Code(s): I60.9 - NONTRAUMATIC SUBARACHNOID HEMORRHAGE, UNSPECIFIED   





(2) Anemia


Current Visit: Yes   Status: Acute   


Qualifiers: 


   Anemia type: iron deficiency   Iron deficiency anemia type: other iron defi

ciency   Qualified Code(s): D50.8 - Other iron deficiency anemias   


Assessment & Plan: 


likely due to acute blood loss with the fall - will continue to follow; if 

acute, will have to discuss with family whether they wish to transfuse or not.


Code(s): D64.9 - ANEMIA, UNSPECIFIED   





(3) Arrhythmia


Current Visit: Yes   Status: Acute   


Qualifiers: 


   Arrhythmia type: other cardiac arrhythmia   Qualified Code(s): I49.8 - Other 

specified cardiac arrhythmias   


Code(s): I49.9 - CARDIAC ARRHYTHMIA, UNSPECIFIED   





(4) Leukocytosis


Current Visit: Yes   Status: Acute   


Qualifiers: 


   Leukocytosis type: unspecified   Qualified Code(s): D72.829 - Elevated white 

blood cell count, unspecified   


Assessment & Plan: 


will recheck


Code(s): D72.829 - ELEVATED WHITE BLOOD CELL COUNT, UNSPECIFIED   





(5) Hypokalemia


Current Visit: Yes   Status: Acute   Code(s): E87.6 - HYPOKALEMIA   





(6) Facial bones, closed fracture


Current Visit: Yes   Status: Acute   


Qualifiers: 


   Encounter type: initial encounter   Facial bone/location: unspecified facial 

bone   Qualified Code(s): S02.92XA - Unspecified fracture of facial bones, 

initial encounter for closed fracture   


Code(s): S02.92XA - UNSP FRACTURE OF FACIAL BONES, INIT FOR CLOS FX   





(7) Fall


Current Visit: Yes   Status: Acute   


Qualifiers: 


   Encounter type: initial encounter   Qualified Code(s): W19.XXXA - Unspecified

 fall, initial encounter   


Code(s): W19.XXXA - UNSPECIFIED FALL, INITIAL ENCOUNTER   





(8) Pelvic fracture


Current Visit: Yes   Status: Acute   


Qualifiers: 


   Encounter type: initial encounter 


Code(s): S32.9XXA - FRACTURE OF UNSP PARTS OF LUMBOSACRAL SPINE AND PELVIS, INIT

   





(9) DNR (do not resuscitate)


Current Visit: Yes   Status: Acute   





(10) Oliguria


Current Visit: Yes   Status: Acute   


Assessment & Plan: 


recheck labs


Code(s): R34 - ANURIA AND OLIGURIA

## 2023-02-06 LAB
ABO GROUP BLD: (no result)
HCT VFR BLD AUTO: 22.9 % (ref 35–47)
HGB BLD-MCNC: 7.3 G/DL (ref 12–16)
RH BLD: POSITIVE
TYPE + XM PNL BLD: (no result)

## 2023-02-06 RX ADMIN — HYDROCODONE BITARTRATE AND ACETAMINOPHEN SCH TAB: 5; 325 TABLET ORAL at 16:54

## 2023-02-06 RX ADMIN — HYDROCODONE BITARTRATE AND ACETAMINOPHEN SCH TAB: 5; 325 TABLET ORAL at 00:23

## 2023-02-06 RX ADMIN — DOCUSATE SODIUM SCH MG: 100 CAPSULE, LIQUID FILLED ORAL at 10:32

## 2023-02-06 RX ADMIN — DOCUSATE SODIUM SCH MG: 100 CAPSULE, LIQUID FILLED ORAL at 21:20

## 2023-02-06 RX ADMIN — LATANOPROST SCH ML: 50 SOLUTION OPHTHALMIC at 21:21

## 2023-02-06 RX ADMIN — HYDROCODONE BITARTRATE AND ACETAMINOPHEN SCH TAB: 5; 325 TABLET ORAL at 20:05

## 2023-02-06 RX ADMIN — CEFTRIAXONE SCH MLS/HR: 1 INJECTION, SOLUTION INTRAVENOUS at 10:32

## 2023-02-06 RX ADMIN — HYDROCODONE BITARTRATE AND ACETAMINOPHEN SCH: 5; 325 TABLET ORAL at 03:54

## 2023-02-06 RX ADMIN — HYDROCODONE BITARTRATE AND ACETAMINOPHEN SCH TAB: 5; 325 TABLET ORAL at 08:07

## 2023-02-06 RX ADMIN — FAMOTIDINE SCH MG: 20 TABLET, FILM COATED ORAL at 21:21

## 2023-02-06 RX ADMIN — Medication SCH TAB: at 08:06

## 2023-02-06 RX ADMIN — ESCITALOPRAM OXALATE SCH MG: 10 TABLET ORAL at 08:10

## 2023-02-06 RX ADMIN — HYDROCODONE BITARTRATE AND ACETAMINOPHEN SCH TAB: 5; 325 TABLET ORAL at 13:36

## 2023-02-06 RX ADMIN — FAMOTIDINE SCH MG: 20 TABLET, FILM COATED ORAL at 08:07

## 2023-02-06 RX ADMIN — TIMOLOL MALEATE SCH ML: 2.5 SOLUTION OPHTHALMIC at 08:18

## 2023-02-06 RX ADMIN — HYDROCODONE BITARTRATE AND ACETAMINOPHEN SCH: 5; 325 TABLET ORAL at 23:50

## 2023-02-06 NOTE — PCM.NOTE
Date and Time: 02/06/23 1754





Subjective Assessment: 





doing ok





- Review of Systems


Constitutional: No Fever, No Chills


Eyes: No Symptoms


Ears, Nose, & Throat: No Symptoms


Respiratory: No Cough, No Short Of Breath


Cardiac: No Chest Pain, No Edema, No Syncope


Abdominal/Gastrointestinal: No Abdominal Pain, No Nausea, No Vomiting, No 

Diarrhea


Genitourinary Symptoms: No Dysuria


Musculoskeletal: No Back Pain, No Neck Pain


Skin: No Rash


Neurological: No Dizziness, No Focal Weakness, No Sensory Changes


Psychological: No Symptoms


Endocrine: No Symptoms


Hematologic/Lymphatic: No Symptoms


Immunological/Allergic: No Symptoms





Objective Exam


General Appearance: no apparent distress, alert


Neurologic Exam: alert, oriented x 3, cooperative, normal mood/affect, nml 

cerebellar function, sensation nml, No motor deficits


Skin Exam: normal color, warm, dry


Eye Exam: PERRL, EOMI, eyes nml inspection


Ears, Nose, Throat Exam: normal ENT inspection, pharynx normal, moist mucous 

membranes


Neck Exam: normal inspection, non-tender, supple, full range of motion


Respiratory Exam: normal breath sounds, lungs clear, No respiratory distress


Cardiovascular Exam: regular rate/rhythm, normal heart sounds


Gastrointestinal/Abdomen Exam: soft, No tenderness, No mass


Extremity Exam: normal inspection, normal range of motion


Back Exam: normal inspection, normal range of motion, No CVA tenderness, No 

vertebral tenderness


Pelvic Exam: deferred


Rectal Exam: deferred





OBJECTIVE DATA


Vital Signs: 


                               Vital Signs - 24 hr











  Temp Pulse Resp BP Pulse Ox


 


 02/06/23 16:00  98.4 F  66  18  113/53  91 L


 


 02/06/23 12:02      93 L


 


 02/06/23 11:29  97.3 F  91 H  18  106/54  92 L


 


 02/06/23 07:05  98.4 F  91 H  19  112/56  97


 


 02/06/23 04:00    16   96


 


 02/06/23 00:00  98.2 F  75  16  106/56  92 L


 


 02/05/23 22:19      91 L


 


 02/05/23 19:28  97.8 F  65  16  104/54  95








                        Pain Assessment - Last Documented











Pain Intensity                 0


 


[facility] Scale Used                [facility]











Intake and Output: 


                                 Intake & Output











 02/04/23 02/05/23 02/06/23 02/07/23





 11:59 11:59 11:59 11:59


 


Intake Total  1547 1041 843


 


Output Total  275 575 250


 


Balance  1272 475 593


 


Weight 43 kg   











Lab Results: 


                            Lab Results-Last 24 Hours











  02/06/23 02/06/23 02/06/23 Range/Units





  12:39 14:45 14:45 


 


Hgb  7.3 L    (12.0-16.0)  g/dL


 


Hct  22.9 L    (35-47)  %


 


ABO Group   A   


 


Rh Factor   POSITIVE   


 


Antibody Screen   NEGATIVE   (NEGATIVE)  


 


Crossmatch   COMPATIBLE  COMPATIBLE  (COMPATIBLE)  











Multi-Disciplinary Progress Notes: 


                        Multi-Disciplinary Progress Notes





02/06/23 14:46 Physical Therapy Note by Renetta(L#[id number])Lynette


HOLDING P.T. EVAL THIS DATE D/T CONTRAINDICATION D/T LOW HGB.  PT. TO RECEIVE 

BLOOD TRANSFUSION.  WILL ATTEMPT TOMORROW IF HGB IMPROVED. 





   ** Electronically signed by Renetta(L#[id number])Lynette, PT on 02/06/23 14:47 **


Initialized on 02/06/23 14:46 - END OF NOTE








02/06/23 10:04 Case Management Note by Elizabeth Recinos


S/W STONE AT [facility]- PATIENT CAN RETURN WHEN MEDICALLY READY. NO 

PRECERT REQUIRED





Initialized on 02/06/23 10:04 - END OF NOTE

















Assessment/Plan


(1) Subarachnoid hem w/o coma


Current Visit: Yes   Status: Acute   


Qualifiers: 


   Encounter type: sequela   Qualified Code(s): S06.6X0S - Traumatic 

subarachnoid hemorrhage without loss of consciousness, sequela   


Code(s): S06.6X0A - TRAUM SUBRAC HEM W/O LOSS OF CONSCIOUSNESS, INIT   





(2) Anemia


Current Visit: Yes   Status: Acute   


Qualifiers: 


   Anemia type: iron deficiency   Iron deficiency anemia type: chronic blood 

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

(chronic)   


Code(s): D64.9 - ANEMIA, UNSPECIFIED   





(3) Facial bones, closed fracture


Current Visit: Yes   Status: Acute   


Qualifiers: 


   Encounter type: initial encounter   Facial bone/location: unspecified facial 

bone   Qualified Code(s): S02.92XA - Unspecified fracture of facial bones, 

initial encounter for closed fracture   


Code(s): S02.92XA - UNSP FRACTURE OF FACIAL BONES, INIT FOR CLOS FX   





(4) Fall


Current Visit: Yes   Status: Acute   


Qualifiers: 


   Encounter type: subsequent encounter   Qualified Code(s): W19.XXXD - 

Unspecified fall, subsequent encounter   


Code(s): W19.XXXA - UNSPECIFIED FALL, INITIAL ENCOUNTER   





(5) Pelvic fracture


Current Visit: Yes   Status: Acute   


Qualifiers: 


   Encounter type: subsequent encounter   Pelvic bone location: other part of 

pelvis   Fracture type: closed   Fracture healing: with routine healing   

Qualified Code(s): S32.89XD - Fracture of other parts of pelvis, subsequent 

encounter for fracture with routine healing   


Code(s): S32.9XXA - FRACTURE OF UNSP PARTS OF LUMBOSACRAL SPINE AND PELVIS, INIT

  





(6) Maxillary fracture


Current Visit: No   Status: Acute   Code(s): S02.401A - MAXILLARY FRACTURE, 

UNSPECIFIED SIDE, INIT

## 2023-02-07 VITALS — SYSTOLIC BLOOD PRESSURE: 134 MMHG | DIASTOLIC BLOOD PRESSURE: 64 MMHG | HEART RATE: 82 BPM | OXYGEN SATURATION: 94 %

## 2023-02-07 LAB
HCT VFR BLD AUTO: 36.1 % (ref 35–47)
HGB BLD-MCNC: 11.5 G/DL (ref 12–16)

## 2023-02-07 RX ADMIN — FAMOTIDINE SCH MG: 20 TABLET, FILM COATED ORAL at 08:19

## 2023-02-07 RX ADMIN — DOCUSATE SODIUM SCH MG: 100 CAPSULE, LIQUID FILLED ORAL at 08:15

## 2023-02-07 RX ADMIN — TIMOLOL MALEATE SCH ML: 2.5 SOLUTION OPHTHALMIC at 08:20

## 2023-02-07 RX ADMIN — ESCITALOPRAM OXALATE SCH MG: 10 TABLET ORAL at 08:15

## 2023-02-07 RX ADMIN — Medication SCH TAB: at 08:19

## 2023-02-07 RX ADMIN — HYDROCODONE BITARTRATE AND ACETAMINOPHEN SCH TAB: 5; 325 TABLET ORAL at 08:17

## 2023-02-07 RX ADMIN — HYDROCODONE BITARTRATE AND ACETAMINOPHEN SCH TAB: 5; 325 TABLET ORAL at 04:01

## 2023-02-07 RX ADMIN — CEFTRIAXONE SCH MLS/HR: 1 INJECTION, SOLUTION INTRAVENOUS at 08:10

## 2023-02-07 NOTE — PCM.DS
Discharge Summary


Date of Admission: 


02/04/23 10:40





Admitting Physician: 


MARTIN AVENDAÑO





Primary Care Provider: 


BHARAT AVILA








Allergies


Allergies





No Known Drug Allergies Allergy (Verified 01/20/23 03:54)


   











Hospital Summary





- Hospital Course


Hospital Course: 








                                 Chief Complaint





Diagnosis                        SAH, Facial FX, Pelvic Fx,Fall, COPD





                                    Allergies











Allergy/AdvReac Type Severity Reaction Status Date / Time


 


No Known Drug Allergies Allergy   Verified 01/20/23 03:54








                           Vital Signs (Last 24 hours)











  Temp Pulse Resp BP Pulse Ox


 


 02/07/23 07:52  98.7 F  82  16  134/64  94 L


 


 02/07/23 04:00  98.0 F  88  17  131/60  96


 


 02/06/23 23:48  98.4 F  61  20  118/56  95


 


 02/06/23 20:00  98.2 F  66   111/67 


 


 02/06/23 19:25      91 L


 


 02/06/23 16:00  98.4 F  66  18  113/53  91 L


 


 02/06/23 12:02      93 L


 


 02/06/23 11:29  97.3 F  91 H  18  106/54  92 L








                                Home Medications











 Medication  Instructions  Recorded  Confirmed  Last Taken  Type


 


Acetaminophen 325 mg*** [Tylenol 650 mg PO Q4H PRN 02/04/23 02/04/23 Unknown 

History





325 mg***]     


 


Vits A,C,E/Lutein/Minerals [I-Sonia 1 each PO DAILY 02/04/23 02/04/23 02/03/23 

History





Tablet]     








                               Current Medications











Generic Name Dose Route Start Last Admin





  Trade Name Freq  PRN Reason Stop Dose Admin


 


Acetaminophen  650 mg  02/04/23 13:42 





  Acetaminophen 325 Mg Tablet  PO  03/06/23 13:41 





  Q4H/PRN PRN  





  PAIN  


 


Hydrocodone Bitart/Acetaminophen  1 tab  02/04/23 16:00  02/07/23 04:01





  Hydrocodone/Apap 5/325 1 Tab Tablet  PO  02/09/23 15:59  1 tab





  Q4H PAULINA   Administration


 


Clonazepam  0.125 mg  02/04/23 14:00  02/06/23 08:08





  Clonazepam 0.5 Mg Tablet  PO  03/06/23 13:59  0.125 mg





  DAILY PAULINA   Administration


 


Clonazepam  0.25 mg  02/04/23 22:00  02/06/23 21:21





  Clonazepam 0.5 Mg Tablet  PO  03/06/23 21:59  0.25 mg





  HS PAULINA   Administration


 


Docusate Sodium  100 mg  02/05/23 10:00  02/06/23 21:20





  Docusate Sodium 100 Mg Capsule  PO  03/07/23 09:59  100 mg





  BID PAULINA   Administration


 


Donepezil HCl  5 mg  02/07/23 10:00 





  Donepezil Hcl 10 Mg Tablet  PO  03/09/23 09:59 





  DAILY PAULINA  


 


Escitalopram Oxalate  2.5 mg  02/04/23 14:00  02/06/23 08:10





  Escitalopram Oxalate** 10 Mg Tablet  PO  03/06/23 13:59  2.5 mg





  DAILY PAULINA   Administration


 


Famotidine  20 mg  02/04/23 22:00  02/06/23 21:21





  Famotidine 20 Mg Tablet  PO  03/06/23 21:59  20 mg





  BID PAULINA   Administration


 


Hydromorphone HCl  0.5 mg  02/04/23 09:40  02/04/23 13:06





  Hydromorphone 1 Mg/1ml Inj*** 1 Mg/Ml Syringe  IV  02/09/23 09:39  0.5 mg





  Q2H PRN PRN   Administration





  PAIN  


 


Sodium Chloride  1,000 mls @ 70 mls/hr  02/04/23 08:15  02/06/23 16:50





  Sodium Chloride 0.9% 1000 Ml  IV  03/06/23 08:14  70 mls/hr





  .[id number] PAULINA   Administration


 


Ceftriaxone Sodium/Dextrose  1 g in 50 mls @ 100 mls/hr  02/04/23 16:55  

02/06/23 10:32





  Rocephin 1 Gm-D5w 50 Ml Bag**  IV  02/07/23 16:54  100 mls/hr





  Q24H10 PAULINA   Administration


 


Latanoprost  0 ml  02/04/23 22:00  02/06/23 21:21





  Latanoprost 2.5 Ml Bottle  OP  03/06/23 21:59  2.5 ml





  HS PAULINA   Administration


 


Memantine  5 mg  02/07/23 10:00 





  Memantine Hcl 5 Mg Tablet  PO  03/09/23 09:59 





  DAILY PAULINA  


 


Multivitamins/Minerals  1 tab  02/04/23 14:00  02/06/23 08:06





  Beta-Carotene(A) W-C And E/Min 1 Tab Tablet  PO  03/06/23 13:59  1 tab





  DAILY PAULINA   Administration


 


Ondansetron HCl  4 mg  02/04/23 09:40 





  Ondansetron Hcl 4 Mg/2 Ml Vial  IV  03/06/23 09:39 





  Q6H PRN PRN  





  NAUSEA/VOMITING  


 


Timolol Maleate  0 ml  02/04/23 14:00  02/06/23 08:18





  Timolol Maleate 0.25% 5 Ml Bottle Eye Drops  OP  03/06/23 13:59  5 ml





  DAILY PAULINA   Administration














Discontinued Medications














Generic Name Dose Route Start Last Admin





  Trade Name Freq  PRN Reason Stop Dose Admin


 


Hydrocodone Bitart/Acetaminophen  1 tab  02/04/23 14:15 





  Hydrocodone/Apap 5/325 1 Tab Tablet  PO  02/09/23 14:14 





  Q4H PRN PRN  





  PAIN  


 


Hydromorphone HCl  0.5 mg  02/04/23 08:11  02/04/23 09:11





  Hydromorphone 1 Mg/1ml Inj*** 1 Mg/Ml Syringe  IV  02/04/23 08:12  0.5 mg





  STAT ONE   Administration


 


Hydromorphone HCl  Confirm  02/04/23 09:08 





  Hydromorphone 1 Mg/1ml Inj*** 1 Mg/Ml Syringe  Administered  02/04/23 09:09 





  Dose  





  1 mg  





  .ROUTE  





  .STK-MED ONE  








                         Intake & Output (Last 24 hours)











 02/04/23 02/05/23 02/06/23 02/07/23





 11:59 11:59 11:59 11:59


 


Intake Total  1547 1041 1894


 


Output Total  275 575 550


 


Balance  6364 821 0769


 


Weight 43 kg   








                       Laboratory Results (Last 24 hours)











  02/07/23 02/06/23 02/06/23





  01:15 14:45 14:45


 


Hgb  11.5 L D  


 


Hct  36.1  


 


ABO Group    A


 


Rh Factor    POSITIVE


 


Antibody Screen    NEGATIVE


 


Crossmatch   COMPATIBLE  COMPATIBLE














  02/06/23





  12:39


 


Hgb  7.3 L


 


Hct  22.9 L


 


ABO Group 


 


Rh Factor 


 


Antibody Screen 


 


Crossmatch 








                             Orders (Last 24 hours)











 Category Date Time Status


 


 ABO TYPING Routine Lab  02/06/23 14:45 Completed


 


 Antibody Screen Routine Lab  02/06/23 14:45 Completed


 


 BLOOD COMPONENT REQUEST Urgent Lab  02/06/23 14:53 Completed


 


 HEMOGLOBIN AND HEMATOCRIT Stat Lab  02/06/23 12:39 Completed


 


 HEMOGLOBIN AND HEMATOCRIT Urgent Lab  02/07/23 01:15 Completed


 


 RH TYPING Routine Lab  02/06/23 14:45 Completed


 


 Donepezil HCl 10 mg*** [Aricept 10 MG***] Med  02/07/23 10:00 Active





 5 mg PO DAILY   


 


 Memantine HCl 5 mg*** [Namenda 5 MG***] Med  02/07/23 10:00 Active





 5 mg PO DAILY   


 


 PT Eval & Treat (MD Order) ONCE PT  02/06/23 09:43 Active








                       Patient Care Notes (Last 24 hours)





02/06/23 17:20 (created 02/06/23 18:35) Nursing Note by Sirisha Chaudhry


Blood transfusion started without difficulty, VS at start are as follows BP- 

111/56, P- 66, T-97.7, 15 min after start BP- 107/54 P-66 T- 98.2 





Initialized on 02/06/23 18:35 - END OF NOTE








02/06/23 14:46 Physical Therapy Note by Renetta(L#[id number])Lynette


HOLDING P.T. EVAL THIS DATE D/T CONTRAINDICATION D/T LOW HGB.  PT. TO RECEIVE 

BLOOD TRANSFUSION.  WILL ATTEMPT TOMORROW IF HGB IMPROVED. 





   ** Electronically signed by Renetta(L#[id number])Lynette, PT on 02/06/23 14:47 **


Initialized on 02/06/23 14:46 - END OF NOTE








02/06/23 10:04 Case Management Note by Elizabeth Recinos


S/[facility] AT [facility]- PATIENT CAN RETURN WHEN MEDICALLY READY. NO 

PRECERT REQUIRED





Initialized on 02/06/23 10:04 - END OF NOTE

















- Vitals & Intake/Output


Vital Signs: 





                                   Vital Signs











Temperature  98.7 F   02/07/23 07:52


 


Pulse Rate  82   02/07/23 07:52


 


Respiratory Rate  16   02/07/23 07:52


 


Blood Pressure  134/64   02/07/23 07:52


 


O2 Sat by Pulse Oximetry  94 L  02/07/23 07:52











Intake & Output: 





                                 Intake & Output











 02/04/23 02/05/23 02/06/23 02/07/23





 11:59 11:59 11:59 11:59


 


Intake Total  1547 1041 1894


 


Output Total  754 575 550


 


Balance  3474 908 2457


 


Weight 43 kg   














- Lab


Result Diagrams: 


                                 02/07/23 01:15





                                 02/05/23 10:08


Lab Results-Last 24 Hrs: 





                            Lab Results-Last 24 Hours











  02/06/23 02/06/23 02/06/23 Range/Units





  12:39 14:45 14:45 


 


Hgb  7.3 L    (12.0-16.0)  g/dL


 


Hct  22.9 L    (35-47)  %


 


ABO Group   A   


 


Rh Factor   POSITIVE   


 


Antibody Screen   NEGATIVE   (NEGATIVE)  


 


Crossmatch   COMPATIBLE  COMPATIBLE  (COMPATIBLE)  














  02/07/23 Range/Units





  01:15 


 


Hgb  11.5 L D  (12.0-16.0)  g/dL


 


Hct  36.1  (35-47)  %


 


ABO Group   


 


Rh Factor   


 


Antibody Screen   (NEGATIVE)  


 


Crossmatch   (COMPATIBLE)  











Micro Results-Entire Visit: 





                                  Microbiology











 02/04/23 09:55 Urine Culture - Final





 Catherized    <10K NORMAL SKIN GRIFFIN





    PROBABLE SKIN CONTAMINANT














- Procedures and Test


Procedures and Tests throughout Hospitalization: 





                            Therapy Orders & Screens





02/05/23 14:09


Oxygen Nasal Cannula 3 lpm 


   Comment: 


   Diagnosis: SAH, Facial FX, Pelvic Fx,Fall, COPD





02/06/23 09:43


PT Eval & Treat (MD Order) ONCE 


   Reason for Eval:: FALL AT THE ARTHUR


   Diagnosis: SAH, Facial FX, Pelvic Fx,Fall, COPD














Discharge Exam


General Appearance: no apparent distress, alert


Neurologic Exam: alert, oriented x 3, cooperative, normal mood/affect, nml 

cerebellar function, sensation nml, No motor deficits


Eye Exam: PERRL, EOMI, eyes nml inspection


Ears, Nose, Throat Exam: normal ENT inspection, pharynx normal, moist mucous 

membranes


Neck Exam: normal inspection, non-tender, supple, full range of motion


Respiratory Exam: normal breath sounds, lungs clear, No respiratory distress


Cardiovascular Exam: regular rate/rhythm, normal heart sounds


Gastrointestinal/Abdomen Exam: soft, No tenderness, No mass


Pelvic Exam: deferred


Rectal Exam: deferred


Back Exam: normal inspection, normal range of motion, No CVA tenderness, No 

vertebral tenderness


Extremity Exam: normal inspection, normal range of motion


Skin Exam: normal color, warm, dry





Final Diagnosis/Problem List





- Final Discharge Diagnosis/Problem


(1) Subarachnoid hem w/o coma


Current Visit: Yes   Status: Resolved   Code(s): S06.6X0A - TRAUM SUBRAC HEM W/O

LOSS OF CONSCIOUSNESS, INIT   





(2) Anemia


Current Visit: Yes   Status: Chronic   Code(s): D64.9 - ANEMIA, UNSPECIFIED   





(3) Facial bones, closed fracture


Current Visit: Yes   Status: Acute   Code(s): S02.92XA - UNSP FRACTURE OF FACIAL

BONES, INIT FOR CLOS FX   





(4) Fall


Current Visit: Yes   Status: Resolved   Code(s): W19.XXXA - UNSPECIFIED FALL, 

INITIAL ENCOUNTER   





(5) Pelvic fracture


Current Visit: Yes   Status: Acute   Code(s): S32.9XXA - FRACTURE OF UNSP PARTS 

OF LUMBOSACRAL SPINE AND PELVIS, INIT   





(6) Maxillary fracture


Current Visit: Yes   Status: Acute   Code(s): S02.401A - MAXILLARY FRACTURE, 

UNSPECIFIED SIDE, INIT   





- Discharge


Discharge Date: 02/07/23


Disposition: Home, Self-Care


Condition: Stable


Prescriptions: 


Continue


   clonazePAM [Clonazepam] 0.25 mg PO HS


   clonazePAM [Clonazepam] 0.125 mg PO DAILY


   Donepezil HCl 5 mg PO HS


   Escitalopram Oxalate [Lexapro] 2.5 mg PO DAILY


   Famotidine 20 mg*** [Pepcid 20 MG***] 20 mg PO BID


   Ferrous Sulfate [Ferosul] 325 mg PO BID


   Latanoprost/Pf [Latanoprost 0.005% Eye Drop] 1 drop OP HS


   Memantine HCl 5 mg PO DAILY


   Timolol Maleate 0.25% Eye*** [Timolol 0.25% Opth Sol 5 ML***] 1 drop OP DAILY


   Vits A,C,E/Lutein/Minerals [I-Sonia Tablet] 1 each PO DAILY


   Acetaminophen 325 mg*** [Tylenol 325 mg***] 650 mg PO Q4H PRN


     PRN Reason: Pain





Discontinued


   Clopidogrel Bisulfate [Plavix] 75 mg PO DAILY


Follow up with: 


BHARAT AVILA MD [Primary Care Provider] -